# Patient Record
Sex: FEMALE | URBAN - METROPOLITAN AREA
[De-identification: names, ages, dates, MRNs, and addresses within clinical notes are randomized per-mention and may not be internally consistent; named-entity substitution may affect disease eponyms.]

---

## 2021-07-23 ENCOUNTER — APPOINTMENT (RX ONLY)
Dept: URBAN - METROPOLITAN AREA CLINIC 41 | Facility: CLINIC | Age: 41
Setting detail: DERMATOLOGY
End: 2021-07-23

## 2021-07-23 DIAGNOSIS — Z02.9 ENCOUNTER FOR ADMINISTRATIVE EXAMINATIONS, UNSPECIFIED: ICD-10-CM

## 2021-07-23 PROCEDURE — ? ADDITIONAL NOTES

## 2021-07-23 NOTE — HPI: MEDICATION
Since Your Last Visit, How Has Your Condition Changed?: unchanged
What Is The Medication?: Oracea 40mg
What Condition Does This Medication Treat?: Rosacea
Additional History: Follow up for Rosacea treatment. Comes and goes, no clear improvement.\\n\\nLast saw DK in 2019. New to [facility]